# Patient Record
Sex: FEMALE | Race: WHITE | NOT HISPANIC OR LATINO | Employment: FULL TIME | URBAN - METROPOLITAN AREA
[De-identification: names, ages, dates, MRNs, and addresses within clinical notes are randomized per-mention and may not be internally consistent; named-entity substitution may affect disease eponyms.]

---

## 2020-08-11 PROBLEM — G89.29 CHRONIC HEADACHE: Status: ACTIVE | Noted: 2020-08-11

## 2020-08-11 PROBLEM — R51.9 CHRONIC HEADACHE: Status: ACTIVE | Noted: 2020-08-11

## 2020-08-11 PROBLEM — G93.5 CHIARI I MALFORMATION: Status: ACTIVE | Noted: 2020-08-11

## 2020-12-07 PROBLEM — R51.9 CHRONIC NONINTRACTABLE HEADACHE: Status: ACTIVE | Noted: 2020-12-07

## 2020-12-07 PROBLEM — G89.29 CHRONIC NONINTRACTABLE HEADACHE: Status: ACTIVE | Noted: 2020-12-07

## 2021-05-24 PROBLEM — Z01.818 PREOP EXAMINATION: Status: ACTIVE | Noted: 2021-05-24

## 2021-05-28 PROBLEM — G93.5 CHIARI MALFORMATION TYPE I: Status: ACTIVE | Noted: 2021-05-28

## 2021-06-10 PROBLEM — Z98.890 STATUS POST CRANIOTOMY: Status: ACTIVE | Noted: 2021-06-10

## 2022-08-16 PROBLEM — R63.6 UNDERWEIGHT: Status: ACTIVE | Noted: 2022-08-16

## 2022-08-16 PROBLEM — Z01.818 PREOP EXAMINATION: Status: RESOLVED | Noted: 2021-05-24 | Resolved: 2022-08-16

## 2022-09-19 ENCOUNTER — PATIENT OUTREACH (OUTPATIENT)
Dept: EMERGENCY MEDICINE | Facility: OTHER | Age: 32
End: 2022-09-19
Payer: MEDICAID

## 2022-09-19 NOTE — PROGRESS NOTES
Spoke to patient today and she stated she was doing well. Pt. Stated her only need is a refill on the Fioricet she takes for her headaches because right now all she has is Ibuprofen. Pt. Has a F/U with primary care towards the end of the month and stated she does not want to get in a bind and not have Fioricet on hand. I informed her I would reach out to the clinic to see if they will refill to the [facility] as requested. Message sent through Epic to Amberly Ruiz RN requesting assistance with this. Pt. Had no other needs at this time. Instructed pt to call with any future needs/concerns and she verbalized understanding.

## 2022-09-29 PROBLEM — J41.8 MIXED SIMPLE AND MUCOPURULENT CHRONIC BRONCHITIS: Status: ACTIVE | Noted: 2022-09-29

## 2022-09-29 PROBLEM — F41.1 GENERALIZED ANXIETY DISORDER: Status: ACTIVE | Noted: 2022-09-29

## 2023-08-02 PROBLEM — J41.8 MIXED SIMPLE AND MUCOPURULENT CHRONIC BRONCHITIS: Status: RESOLVED | Noted: 2022-09-29 | Resolved: 2023-08-02

## 2024-03-07 PROBLEM — M54.50 CHRONIC MIDLINE LOW BACK PAIN WITHOUT SCIATICA: Status: ACTIVE | Noted: 2024-03-07

## 2024-03-07 PROBLEM — G89.29 CHRONIC MIDLINE LOW BACK PAIN WITHOUT SCIATICA: Status: ACTIVE | Noted: 2024-03-07

## 2024-10-07 ENCOUNTER — PATIENT OUTREACH (OUTPATIENT)
Dept: ADMINISTRATIVE | Facility: OTHER | Age: 34
End: 2024-10-07
Payer: MEDICAID

## 2024-10-07 NOTE — PROGRESS NOTES
CHW - Initial Contact    This Community Health Worker completed the Social Determinant of Health questionnaire with patient during clinic visit today.    Pt identified barriers of most importance are: patient identified no barriers of importance during clinic visit .   Patient did express some stress concerns related to personal reasons.    Referrals to community agencies completed with patient consent outside of [facility] include: No community referral needed during clinic visit  Referrals were put through [facility] - no:   Support and Services: No support services needed during clinic visit   Other information discussed the patient needs help with: patient discussed no other information during clinic visit    Follow up required: yes  Follow-up Outreach - Due: 10/21/2024

## 2024-12-02 ENCOUNTER — PATIENT OUTREACH (OUTPATIENT)
Dept: ADMINISTRATIVE | Facility: OTHER | Age: 34
End: 2024-12-02
Payer: MEDICAID

## 2024-12-02 NOTE — PROGRESS NOTES
CHW - Follow Up    This Community Health Worker completed a follow up visit with patient during clinic visit today.  Pt reported: patient reported she is doing fine no assistance is needed during follow up clinic visit.  Patient will reach out if assistance is needed in the future.   Community Health Worker provided: patient reported she is doing fine   Follow up required: No  No future outreach task assigned                 CHW - Case Closure    This Community Health Worker spoke to patient during clinic visit today.   Pt reported: patient reported she is doing fine no assistance is needed during follow up clinic visit.  Patient will reach out if assistance is needed in the future.   Pt denied any additional needs at this time and agrees with episode closure at this time.    Provided patient with Community Health Worker's contact information and encouraged   her to contact this Community Health Worker if additional needs arise.